# Patient Record
Sex: FEMALE | Employment: PART TIME | URBAN - METROPOLITAN AREA
[De-identification: names, ages, dates, MRNs, and addresses within clinical notes are randomized per-mention and may not be internally consistent; named-entity substitution may affect disease eponyms.]

---

## 2020-10-27 ENCOUNTER — HOSPITAL ENCOUNTER (OUTPATIENT)
Dept: PHYSICAL THERAPY | Age: 65
Discharge: HOME | End: 2020-10-27
Payer: MEDICARE

## 2020-10-27 PROCEDURE — 97162 PT EVAL MOD COMPLEX 30 MIN: CPT

## 2020-10-27 PROCEDURE — 97110 THERAPEUTIC EXERCISES: CPT

## 2020-10-27 NOTE — PROGRESS NOTES
[facility] PHYSICAL THERAPY AT [address] [facility], Albin Huffman [place], [address] - Phone: [phone]  Fax: [phone] / [address] [facility]  Patient Name: Ramila Hernandez : 1955   Medical   Diagnosis: Low back pain [M54.5] Treatment Diagnosis: Low back pain (R>L)   Onset Date: 1.5 months ago     Referral Source: Leah Saba MD [facility]): 10/27/2020   Prior Hospitalization: See medical history Provider #: [id number]   Prior Level of Function: Ind   Comorbidities: HBP, diabetes   Medications: Verified on Patient Summary List   The Plan of Care and following information is based on the information from the initial evaluation.   ===================================================================  Assessment / key information: Patient  is a 72 y.o. yo female who presents to [facility] with diagnosis of Low back pain [M54.5]. Patient reports she went to ER 10- for low back pain, she reports she got up one Friday morning and couldn't move. Pt reports she had MRI done about 2 weeks ago \"pinched nerve in low back\". Pt reports pain is mostly down R LE. As per patient she was taking steroids middle of Sept, and then muscle relaxant prescribed by Dr. Margot Dyson which has improved pain slightly. Pt reports she is currently sleeping on L side secondary to p!. Pain level is an 3-4/10 (C), and ranges 2/10 to 6/10 which increases with \"putting pressure on that nerve\", decreases with rest. Pt reports she is a , job duties require a lot of sitting. Upon objective evaluation, patient demonstrates, impaired and painful trunk AROM flexion, decreased core strength, postural deviations decreased weightbearing through R glute in sitting, L trunk lean. In standing: L trunk lean, L shoulder higher than R, pelvis/lower trunk R rotation.    impaired muscular flexibility and decreased hip strength. Patient scored 62 on FOTO indicating decreased functional status and quality of life. Functional impairmens include modified sleep, sitting with modification, pt reports she is able to walk for less than 1/2 mile (PLOF 1-1.5 miles), Pt currently using cane for ambulation in community and crutches at home, pt reports using crutches for ascending and descending stairs. Gait ambulating with SPC supported by R UE, antalgic, decreased stance on R LE    Postural deviations decreased weightbearing through R glute in sitting, L trunk lean. In standing: L trunk lean, L shoulder higher than R, pelvis/lower trunk R rotation. Decreased pain in prone positioning. Trunk AROM flexion 75%, extension WFL, right side bend 75%, left side bend WNL, right rotation 75%, left rotation 75%. Manual muscle test: Psoas right 3+/5, left 3+/5. Gluteus medius right 3+/5, left 4-/5, gluteus joanna right 2+/5, left 3-/5 , Quadriceps right 5/5, left 5/5,  Anterior tibialis right 5/5 , left 5/5    Special Tests :  Melvin Gum (-) bilat. Flexibility: Passive SLR right 60/90,  left 50/90.  Ely's (+) bilat R>L    Palpation : no Ttp noted along bilat piriformis, bilat QL, bilat lumbar and thoracic paraspinals       Justification for Eval Code Complexity:  Patient History : HBP, Diabetes  Examination see exam   Clinical Presentation: mod  Clinical Decision Making : FOTO : 62 /100    Patient would benefit from PT to address these deficits for increased functional mobility and quality of life.   ===========================================================================================  Eval Complexity: History MEDIUM  Complexity : 1-2 comorbidities / personal factors will impact the outcome/ POC ;  Examination  MEDIUM Complexity : 3 Standardized tests and measures addressing body structure, function, activity limitation and / or participation in recreation ; Presentation MEDIUM Complexity : Evolving with changing characteristics ; Decision Making MEDIUM Complexity : FOTO score of 26-74; Overall Complexity MEDIUM  Problem List: pain affecting function, decrease ROM, decrease strength, impaired gait/ balance, decrease ADL/ functional abilitiies, decrease activity tolerance, decrease flexibility/ joint mobility and decrease transfer abilities   Treatment Plan may include any combination of the following: Therapeutic exercise, Therapeutic activities, Neuromuscular re-education, Physical agent/modality, Gait/balance training, Manual therapy, Aquatic therapy, Patient education, Self Care training, Functional mobility training, Home safety training and Stair training  Patient / Family readiness to learn indicated by: asking questions, trying to perform skills and interest  Persons(s) to be included in education: patient (P)  Barriers to Learning/Limitations: None  Measures taken, if barriers to learning    Patient Goal (s): \"relieve leg pain\"   Patient self reported health status: good  Rehabilitation Potential: good     Short Term Goals: To be accomplished in 2  weeks:  1) Patient will be independent with performing daily initial HEP. 2. Patient will achieve +2 on GROC to improve sitting, walking.  Long Term Goals: To be accomplished in 4-5 weeks:  1) Patient  will be independent  with comprehensive updated HEP   2) Patient will increase bilat hip strength flexion and extension to at least 4/5 to increase ability to perform ADL's and LE functional activities. 3) Increase FOTO to 69 indicating improved function and quality of life. 4)Patient will achieve +4 on GROC to improve sitting, walking.     Frequency / Duration:   Patient to be seen  2  times per week for 4-5  weeks:    Patient / Caregiver education and instruction: other pt provided with initial HEP, cane placement  Therapist Signature: Oscar Doll PT Date: 87/20/5853   Certification Period: 10/27/2020 - 01/25/2021 Time: 1435 ===========================================================================================  I certify that the above Physical Therapy Services are being furnished while the patient is under my care. I agree with the treatment plan and certify that this therapy is necessary. Physician Signature:        Date:       Time:     Please sign and return to [facility] or you may fax the signed copy to [phone]. Thank you.

## 2020-10-27 NOTE — PROGRESS NOTES
PHYSICAL THERAPY - DAILY TREATMENT NOTE    Patient Name: James Whitehead        Date: 10/27/2020  : 1955   yes Patient  Verified  Visit #:   1   of   8-10   Insurance: Payor: Wesley Adjutant / Plan: [facility] MEDICARE COMPLETE / Product Type: Managed Care Medicare /      In time: 7246 Out time: 1250   Total Treatment Time: 62     Medicare/BCBS Time Tracking (below)   Total Timed Codes (min):  10 1:1 Treatment Time:  62     TREATMENT AREA =  Low back pain [M54.5]    SUBJECTIVE  Pain Level (on 0 to 10 scale):  3-4  / 10   Medication Changes/New allergies or changes in medical history, any new surgeries or procedures?    See Summary List   Subjective Functional Status/Changes:  []  No changes reported     Pt reports pain R glute/leg          OBJECTIVE    44 min [x]Eval                  []Re-Eval       Modalities Rationale:     decrease pain and increase tissue extensibility to improve patient's ability to ambulate with decreased pain and discomfort    min [] Estim, type/location:                                      []  att     []  unatt     []  w/US     []  w/ice    []  w/heat    min []  Mechanical Traction: type/lbs                   []  pro   []  sup   []  int   []  cont    []  before manual    []  after manual    min []  Ultrasound, settings/location:      min []  Iontophoresis w/ dexamethasone, location:                                               []  take home patch       []  in clinic   8 min []  Ice     [x]  Heat    location/position: Sitting, L/S    min []  Vasopneumatic Device, press/temp:     min []  Other:    [] Skin assessment post-treatment (if applicable):    []  intact    []  redness- no adverse reaction     []redness - adverse reaction:        10 min Therapeutic Exercise:  [x]  See flow sheet   Rationale:      increase ROM and increase strength to improve the patients ability to perform LE functional activities and ALD's with decreased pain         Billed With/As:   [x] TE   [] TA   [] Neuro   [] Self Care Patient Education: [x] Review HEP    [] Progressed/Changed HEP based on:   [] positioning   [] body mechanics   [] transfers   [] heat/ice application    [] other: provided with initial HEP, cane placement on L UE     Other Objective/Functional Measures:    SEE POC   Post Treatment Pain Level (on 0 to 10) scale:   2  / 10     ASSESSMENT  Assessment/Changes in Function:     SEE POC     [x]  See Progress Note/Recertification   Patient will benefit from skilled PT services to modify and progress therapeutic interventions, address functional mobility deficits, address ROM deficits, address strength deficits, analyze and address soft tissue restrictions, analyze and cue movement patterns, analyze and modify body mechanics/ergonomics, assess and modify postural abnormalities, address imbalance/dizziness and instruct in home and community integration to attain goals and maximize pt's functional status   Progress toward goals / Updated goals:  SEE POC     PLAN  []  Upgrade activities as tolerated yes Continue plan of care   []  Discharge due to :    [x]  Other: SEE POC     Therapist: Linda Mata, PT    Date: 10/27/2020 Time: 1309 pm     Future Appointments   Date Time Provider Tulio Hammond   10/27/2020 11:45 AM [facility]

## 2020-10-30 ENCOUNTER — HOSPITAL ENCOUNTER (OUTPATIENT)
Dept: PHYSICAL THERAPY | Age: 65
Discharge: HOME | End: 2020-10-30
Payer: MEDICARE

## 2020-10-30 PROCEDURE — 97140 MANUAL THERAPY 1/> REGIONS: CPT

## 2020-10-30 PROCEDURE — 97110 THERAPEUTIC EXERCISES: CPT

## 2020-10-30 NOTE — PROGRESS NOTES
PHYSICAL THERAPY - DAILY TREATMENT NOTE    Patient Name: Prosper Chamorro        Date: 10/30/2020  : 1955   yes Patient  Verified  Visit #:   2  of   8-10  Insurance: Payor: Sondra Commander / Plan: [employer] MEDICARE COMPLETE / Product Type: Managed Care Medicare /      In time: 9:00 Out time: 9:53   Total Treatment Time: 53     Medicare/BCBS Time Tracking (below)   Total Timed Codes (min):  43 1:1 Treatment Time:  43     TREATMENT AREA =  Low back pain [M54.5]    SUBJECTIVE  Pain Level (on 0 to 10 scale):  3  / 10 right L/S with posterior radicular sx to behind knee   Medication Changes/New allergies or changes in medical history, any new surgeries or procedures? See Summary List   Subjective Functional Status/Changes:  []  No changes reported   Patient reports compliance with HEP. They make her sore. She describes her pain as more of an ache and discomfort than actual pain.         OBJECTIVE    Modalities Rationale:     decrease pain and increase tissue extensibility to improve patient's ability to ambulate with decreased pain and discomfort    min [] Estim, type/location:                                      []  att     []  unatt     []  w/US     []  w/ice    []  w/heat    min []  Mechanical Traction: type/lbs                   []  pro   []  sup   []  int   []  cont    []  before manual    []  after manual    min []  Ultrasound, settings/location:      min []  Iontophoresis w/ dexamethasone, location:                                               []  take home patch       []  in clinic   10 min []  Ice     [x]  Heat    location/position: Supine with wedge to L/S and glutes    min []  Vasopneumatic Device, press/temp:     min []  Other:    [x] Skin assessment post-treatment (if applicable):    [x]  intact    []  redness- no adverse reaction     []redness - adverse reaction:        35/30 min Therapeutic Exercise:  [x]  See flow sheet: NC 5' Bike warmup   Rationale:      increase ROM and increase strength to improve the patients ability to perform LE functional activities and ALD's with decreased pain     8 min Manual Therapy:  performed in prone with 1 pillow under hips  Technique:       [x] STM []ASTM [x] TPR []PROM [x] MFR/Stretching  [x] Jt manipulation []Gr I [] II [x]  III [x] IV [] V []  Treatment Area: right lumbar ps, QL  Other: PA mobs to L3-S1, TrPR to right PSIS/QL     Rationale: decrease pain, increase ROM, increase tissue extensibility, decrease trigger points and increase postural awareness to allow patient to ambulate with improved gait pattern and tolerate prolonged standing/walking. Billed With/As:   [x] TE   [] TA   [] Neuro   [] Self Care Patient Education: [x] Review HEP    [] Progressed/Changed HEP based on:   [] positioning   [] body mechanics   [] transfers   [] heat/ice application    [x] other: education on HNP and centralization goals, given MARTITA 1 x 10 x 5\" each waking hour and report back on results at Monday appt. 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 Other Objective/Functional Measures:   - prone activities with 1 pillow under hips  - tactile and verbal cuing for proper pelvic positioning with prone on elbows  - education on HNP and centralization  - MARTITA and REIL x 10 each  - patient preferred MHP in supine, poor tolerance to prone at this time   Post Treatment Pain Level (on 0 to 10) scale:   2  / 10 with decreased severity of radicular sx     ASSESSMENT  Assessment/Changes in Function:   Initiated PT POC today per flow sheet, requiring vc and demo 100% of the time for proper form and technique with TE.       [x]  See Progress Note/Recertification   Patient will benefit from skilled PT services to modify and progress therapeutic interventions, address functional mobility deficits, address ROM deficits, address strength deficits, analyze and address soft tissue restrictions, analyze and cue movement patterns, analyze and modify body mechanics/ergonomics, assess and modify postural abnormalities, address imbalance/dizziness and instruct in home and community integration to attain goals and maximize pt's functional status   Progress toward goals / Updated goals:  First f/u since evaluation, compliant with HEP, no change in sx at this time.      PLAN  []  Upgrade activities as tolerated yes Continue plan of care   []  Discharge due to :    []  Other:      Therapist: RONNA Sullivan    Date: 10/30/2020 Time: 9:53 AM     Future Appointments   Date Time Provider Tulio Hammond   10/30/2020  9:00 AM [facility] [place] [facility]   11/2/2020  9:45 AM [facility] [place] [facility]   11/6/2020  9:00 AM [facility] PT [place] [facility]   11/11/2020  9:45 AM Ahmed Curling, PT [facility]   11/13/2020  9:00 AM [facility] PT [place] [facility]   11/18/2020  9:15 AM Ahmed Curling, PT [facility]   11/20/2020  9:00 AM [facility] PT [place] [facility]   11/23/2020 10:30 AM [facility] PT [place] [facility]   11/25/2020  8:45 AM Pam Burgess, PT [facility]

## 2020-11-02 ENCOUNTER — HOSPITAL ENCOUNTER (OUTPATIENT)
Dept: PHYSICAL THERAPY | Age: 65
Discharge: HOME | End: 2020-11-02
Payer: MEDICARE

## 2020-11-02 PROCEDURE — 97110 THERAPEUTIC EXERCISES: CPT

## 2020-11-02 PROCEDURE — 97140 MANUAL THERAPY 1/> REGIONS: CPT

## 2020-11-02 NOTE — PROGRESS NOTES
PHYSICAL THERAPY - DAILY TREATMENT NOTE    Patient Name: Will Guerin        Date: 2020  : 1955   yes Patient  Verified  Visit #:   3  of   8-10  Insurance: Payor: Rebekah Keith / Plan: [address] / Product Type: Managed Care Medicare /      In time: 9:37 Out time: 10:25   Total Treatment Time: 47     Medicare/[facility] Time Tracking (below)   Total Timed Codes (min):  38 1:1 Treatment Time:  38     TREATMENT AREA =  Low back pain [M54.5]    SUBJECTIVE  Pain Level (on 0 to 10 scale):  2  / 10 right L/S with posterior radicular sx to behind knee   Medication Changes/New allergies or changes in medical history, any new surgeries or procedures? See Summary List   Subjective Functional Status/Changes:  []  No changes reported   Patient returned to work this weekend after a month off but reports it went well. She got up and did her extensions every hour and it worked well.         OBJECTIVE    Modalities Rationale:     decrease pain and increase tissue extensibility to improve patient's ability to ambulate with decreased pain and discomfort    min [] Estim, type/location:                                      []  att     []  unatt     []  w/US     []  w/ice    []  w/heat    min []  Mechanical Traction: type/lbs                   []  pro   []  sup   []  int   []  cont    []  before manual    []  after manual    min []  Ultrasound, settings/location:      min []  Iontophoresis w/ dexamethasone, location:                                               []  take home patch       []  in clinic   10 min []  Ice     [x]  Heat    Location/position: L/S Prone with 1 pillow under hips, toes over EOB    min []  Vasopneumatic Device, press/temp:     min []  Other:    [x] Skin assessment post-treatment (if applicable):    [x]  intact    []  redness- no adverse reaction     []redness - adverse reaction:        23 min Therapeutic Exercise:  [x]  See flow sheet:    Rationale:      increase ROM and increase strength to improve the patients ability to perform LE functional activities and ALD's with decreased pain     15 min Manual Therapy:  performed in prone with 1 pillow under hips  Technique:       [x] STM []ASTM [x] TPR []PROM [] Stretching  [x] Jt manipulation []Gr I [] II [x]  III [x] IV [] V []  Treatment Area: right lumbar ps  Other: PA mobs to L3-S1, 10 PPU with OP, TrPR to right PSIS/QL     Rationale: decrease pain, increase ROM, increase tissue extensibility, decrease trigger points and increase postural awareness to allow patient to ambulate with improved gait pattern and tolerate prolonged standing/walking. Billed With/As:   [x] TE   [] TA   [] Neuro   [] Self Care Patient Education: [x] Review HEP    [] Progressed/Changed HEP based on:   [] positioning   [] body mechanics   [] transfers   [] heat/ice application    [] other:      Other Objective/Functional Measures:   - prone activities with 1 pillow under hips  - initiated prone TKE, mod challenge   Post Treatment Pain Level (on 0 to 10) scale:   2  / 10 with decreased severity of radicular sx     ASSESSMENT  Assessment/Changes in Function:   Patient presents with decreased baseline pain level but continues to reports sx in posterior leg to behind the knee. Good management of sx with extension based exercises and side glides. Multiple trigger points over right PSIS addressed with TrPR during MT. Patient able to tolerate 10 minutes in prone for MHP modalities today and demonstrates decreased need for [facility] when leaving the clinic.       [x]  See Progress Note/Recertification   Patient will benefit from skilled PT services to modify and progress therapeutic interventions, address functional mobility deficits, address ROM deficits, address strength deficits, analyze and address soft tissue restrictions, analyze and cue movement patterns, analyze and modify body mechanics/ergonomics, assess and modify postural abnormalities, address imbalance/dizziness and instruct in home and community integration to attain goals and maximize pt's functional status   Progress toward goals / Updated goals:  Patient is compliant with HEP and reports some improvement in sx. PLAN  []  Upgrade activities as tolerated yes Continue plan of care   []  Discharge due to :    [x]  Other: Progress extension based program, to no pillow under hips.      Therapist: RONNA Loaiza    Date: 11/2/2020 Time: 10:25 AM     Future Appointments   Date Time Provider Tulio Hammond   11/2/2020  9:45 AM [facility] PT [place] [facility]   11/6/2020  9:00 AM [facility] PT [place] [facility]   11/11/2020  9:45 AM Enoc Wahl, PT [facility]   11/13/2020  9:00 AM [facility] PT [place] [facility]   11/18/2020  9:15 AM Enoc Wahl, PT [facility]   11/20/2020  9:00 AM [facility] PT [place] [facility]   11/23/2020 10:30 AM [facility] PT [place] [facility]   11/25/2020  8:45 AM Ashley Aaron, PT [facility]

## 2020-11-06 ENCOUNTER — APPOINTMENT (OUTPATIENT)
Dept: PHYSICAL THERAPY | Age: 65
End: 2020-11-06
Payer: MEDICARE

## 2020-11-11 ENCOUNTER — HOSPITAL ENCOUNTER (OUTPATIENT)
Dept: PHYSICAL THERAPY | Age: 65
Discharge: HOME | End: 2020-11-11
Payer: MEDICARE

## 2020-11-11 PROCEDURE — 97110 THERAPEUTIC EXERCISES: CPT

## 2020-11-11 PROCEDURE — 97140 MANUAL THERAPY 1/> REGIONS: CPT

## 2020-11-11 NOTE — PROGRESS NOTES
PHYSICAL THERAPY - DAILY TREATMENT NOTE    Patient Name: Roderick Landing        Date: 2020  : 1955   yes Patient  Verified  Visit #:   4  of   8-10  Insurance: Payor: Katheren Cooks / Plan: [address] / Product Type: Managed Care Medicare /      In time: 9:40 Out time: 10:30   Total Treatment Time: 50     Medicare/[facility] Time Tracking (below)   Total Timed Codes (min):  40 1:1 Treatment Time:  40     TREATMENT AREA =  Low back pain [M54.5]    SUBJECTIVE  Pain Level (on 0 to 10 scale):  2  / 10 R Lumbar pain   Medication Changes/New allergies or changes in medical history, any new surgeries or procedures? See Summary List   Subjective Functional Status/Changes:  []  No changes reported   Pt reports extension exercises are helping. Notes return to work has not increased symptoms. Reporting this day at pain to mid-thigh posterior vs to foot as previous. Has been walking in community with [facility] with good response, reduced pain.          OBJECTIVE    Modalities Rationale:     decrease pain and increase tissue extensibility to improve patient's ability to ambulate with decreased pain and discomfort    min [] Estim, type/location:                                      []  att     []  unatt     []  w/US     []  w/ice    []  w/heat    min []  Mechanical Traction: type/lbs                   []  pro   []  sup   []  int   []  cont    []  before manual    []  after manual    min []  Ultrasound, settings/location:      min []  Iontophoresis w/ dexamethasone, location:                                               []  take home patch       []  in clinic   10 min []  Ice     [x]  Heat    Location/position: L/S Prone with 1 pillow under hips    min []  Vasopneumatic Device, press/temp:     min []  Other:    [x] Skin assessment post-treatment (if applicable):    [x]  intact    []  redness- no adverse reaction     []redness - adverse reaction:        30 min Therapeutic Exercise:  [x]  See flow sheet:    Rationale:      increase ROM and increase strength to improve the patients ability to perform LE ADLs with decreased pain     10 min Manual Therapy:  prone  Technique:      [x] STM []ASTM [x] TPR []PROM [] Stretching  [x] Jt manipulation [x]Gr I [x] II [x]  III [x] IV [x] V []  Treatment Area: R lumbar region  Other: PA mobs to ~L1-L5,TrP to right PSIS, QL     Rationale: decrease pain, increase ROM, increase tissue extensibility, decrease trigger points and increase postural awareness to allow patient to ambulate with less pain    Billed With/As:   [x] TE   [] TA   [] Neuro   [] Self Care Patient Education: [x] Review HEP    [] Progressed/Changed HEP based on:   [] positioning   [] body mechanics   [] transfers   [] heat/ice application    [] other:      Other Objective/Functional Measures:   -  Pt ed on prone press ups for HEP  - Pt with review of current HEP to manage symptoms between visits. Post Treatment Pain Level (on 0 to 10) scale:   2  / 10      ASSESSMENT  Assessment/Changes in Function:   -Pt with reduced radicular symptoms noted. -Good response to sideglides and extension based activity. [x]  See Progress Note/Recertification   Patient will benefit from skilled PT services to modify and progress therapeutic interventions, address functional mobility deficits, address ROM deficits, address strength deficits, analyze and address soft tissue restrictions, analyze and cue movement patterns, analyze and modify body mechanics/ergonomics, assess and modify postural abnormalities, address imbalance/dizziness and instruct in home and community integration to attain goals and maximize pt's functional status   Progress toward goals / Updated goals:     · Short Term Goals: To be accomplished in 2  weeks:  1) Patient will be independent with performing daily initial HEP--met. 2) Patient will achieve +2 on GROC to improve sitting, walking--currently at fair progress with subjective reports. PLAN  []  Upgrade activities as tolerated yes Continue plan of care   []  Discharge due to :    [x]  Other: Progress extension based program as appropriate     Therapist: Ella Cottrell PT, PT    Date: 11/11/2020 Time: 10:30 AM     Future Appointments   Date Time Provider Tulio Hammond   11/13/2020  9:00 AM Lorrie Canada [facility]   11/18/2020  9:15 AM John Walters PT [facility]   11/20/2020  9:00 AM [facility] PT [place] [facility]   11/23/2020 10:30 AM [facility] PT [place] [facility]   11/25/2020  8:45 AM Yves Pollard PT [facility]   11/30/2020  9:00 AM Yuniel Car PTA [facility]

## 2020-11-13 ENCOUNTER — HOSPITAL ENCOUNTER (OUTPATIENT)
Dept: PHYSICAL THERAPY | Age: 65
Discharge: HOME | End: 2020-11-13
Payer: MEDICARE

## 2020-11-13 PROCEDURE — 97140 MANUAL THERAPY 1/> REGIONS: CPT

## 2020-11-13 PROCEDURE — 97110 THERAPEUTIC EXERCISES: CPT

## 2020-11-13 NOTE — PROGRESS NOTES
PHYSICAL THERAPY - DAILY TREATMENT NOTE    Patient Name: Nicci Terry        Date: 2020  : 1955    Patient  Verified: YES  Visit #:      12  Insurance: Payor: Jacqueline Munoz / Plan: [employer] MEDICARE COMPLETE / Product Type: Managed Care Medicare /      In time: 9:10 Out time: 10:15   Total Treatment Time: 65     Medicare Time Tracking (below)   Total Timed Codes (min):  55 1:1 Treatment Time:  40     TREATMENT AREA/ DIAGNOSIS = Low back pain [M54.5]    SUBJECTIVE  Pain Level (on 0 to 10 scale):  1  / 10   Medication Changes/New allergies or changes in medical history, any new surgeries or procedures? NO    If yes, update Summary List   Subjective Functional Status/Changes:  []  No changes reported     Pt reports that she is still having buttock pain with ADLs (R)       OBJECTIVE  Modalities Rationale:     decrease pain and increase tissue extensibility to improve patient's ability to perform ADLs without pain     min [] Estim, type/location:                                      []  att     []  unatt     []  w/US     []  w/ice    []  w/heat    min []  Mechanical Traction: type/lbs                   []  pro   []  sup   []  int   []  cont    []  before manual    []  after manual    min []  Ultrasound, settings/location:      min []  Iontophoresis w/ dexamethasone, location:                                               []  take home patch       []  in clinic   10 min []  Ice     [x]  Heat    location/position: L/S    min []  Vasopneumatic Device, press/temp:     min []  Other:    [] Skin assessment post-treatment (if applicable):    []  intact    []  redness- no adverse reaction     []redness - adverse reaction:        45 min Therapeutic Exercise:  [x]  See flow sheet   Rationale:      increase strength and improve coordination to improve the patients ability to perform ADLs without pain       10 min Manual Therapy: STM to L/S (R QL).  PA mobs to L/S grade 3/4   Rationale: increase ROM and increase tissue extensibility to improve patient's ability to perform ADLs without pain      Billed With/As:   [x] TE   [] TA   [] Neuro   [] Self Care Patient Education: [x] Review HEP    [] Progressed/Changed HEP based on:   [] positioning   [] body mechanics   [] transfers   [] heat/ice application    [] other:        Other Objective/Functional Measures:    TTP to paraspinals  Radicular pain to R buttock    Post Treatment Pain Level (on 0 to 10) scale:   1  / 10     ASSESSMENT  Assessment/Changes in Function:     Improved radicular pain with pratibha approach     []  See Progress Note/Recertification   Patient will continue to benefit from skilled PT services to modify and progress therapeutic interventions, address functional mobility deficits, address ROM deficits, address strength deficits, analyze and address soft tissue restrictions and analyze and cue movement patterns to attain remaining goals.    Progress toward goals / Updated goals:    Good Progress to    [] STG    [x] LTG  1 as shown by improved overall pain levels with ADLs     PLAN  [x]  Upgrade activities as tolerated YES Continue plan of care   []  Discharge due to :    []  Other:      Therapist: Dalton Sue DPT     Date: 11/13/2020 Time: 9:03 AM        Future Appointments   Date Time Provider Tulio Hammond   11/18/2020  9:15 AM Sharonda Otto, PT [facility]   11/20/2020  9:00 AM [facility] [place] [facility]   11/23/2020 10:30 AM [facility] [place] [facility]   11/25/2020  8:45 AM [facility]   11/30/2020  9:00 AM [facility] [place] [facility]

## 2020-11-18 ENCOUNTER — HOSPITAL ENCOUNTER (OUTPATIENT)
Dept: PHYSICAL THERAPY | Age: 65
Discharge: HOME | End: 2020-11-18
Payer: MEDICARE

## 2020-11-18 PROCEDURE — 97140 MANUAL THERAPY 1/> REGIONS: CPT

## 2020-11-18 PROCEDURE — 97110 THERAPEUTIC EXERCISES: CPT

## 2020-11-18 NOTE — PROGRESS NOTES
PHYSICAL THERAPY - DAILY TREATMENT NOTE    Patient Name: Josué Gibbs        Date: 2020  : 1955   yes Patient  Verified  Visit #:  6 of   8-10  Insurance: Payor: [employer] / Plan: [employer] MEDICARE COMPLETE / Product Type: Managed Care Medicare /      In time: 989 Out time: 955   Total Treatment Time: 50     Medicare/[employer] Time Tracking (below)   Total Timed Codes (min):  40 1:1 Treatment Time:  40     TREATMENT AREA =  Low back pain [M54.5]    SUBJECTIVE  Pain Level (on 0 to 10 scale):  2  / 10 R Lumbar pain   Medication Changes/New allergies or changes in medical history, any new surgeries or procedures? See Summary List   Subjective Functional Status/Changes:  []  No changes reported   Pain is improving at night. Noting she able to perform R sidelying for sleep x 1- 1.5 hours whereas prior she could not sleep at all on that side. Noting that is improving her overall function. Noting weakness in the leg is primary limitation.          OBJECTIVE    Modalities Rationale:     decrease pain and increase tissue extensibility to improve patient's ability to ambulate with decreased pain and discomfort    min [] Estim, type/location:                                      []  att     []  unatt     []  w/US     []  w/ice    []  w/heat    min []  Mechanical Traction: type/lbs                   []  pro   []  sup   []  int   []  cont    []  before manual    []  after manual    min []  Ultrasound, settings/location:      min []  Iontophoresis w/ dexamethasone, location:                                               []  take home patch       []  in clinic   10 min []  Ice     [x]  Heat      Supine to lumbar    min []  Vasopneumatic Device, press/temp:     min []  Other:    [x] Skin assessment post-treatment (if applicable):    [x]  intact    []  redness- no adverse reaction     []redness - adverse reaction:        30 min Therapeutic Exercise:  [x]  See flow sheet:    Rationale:      increase ROM and increase strength to improve the patients ability to perform LE ADLs with decreased pain     10 min Manual Therapy:  prone  Technique:      [x] STM []ASTM [x] TPR []PROM [] Stretching  [x] Jt manipulation [x]Gr I [x] II [x]  III [x] IV [x] V []  Treatment Area: R lumbar region  Other: PA mobs to ~L1-L5,TPR to R PSIS, QL     Rationale: decrease pain, increase ROM, increase tissue extensibility, decrease trigger points and increase postural awareness to allow patient to ambulate with less pain    Billed With/As:   [x] TE   [] TA   [] Neuro   [] Self Care Patient Education: [x] Review HEP    [] Progressed/Changed HEP based on:   [] positioning   [] body mechanics   [] transfers   [] heat/ice application    [] other:      Other Objective/Functional Measures:   --Pt ed on progression of HEP.  --Pt with ability to perform prone activity with improved motion, reduced verbal and tactile cues. Post Treatment Pain Level (on 0 to 10) scale:   2  / 10      ASSESSMENT  Assessment/Changes in Function:   --Pt with centralization of symptoms with prone positioning and there ex this day. [x]  See Progress Note/Recertification   Patient will benefit from skilled PT services to modify and progress therapeutic interventions, address functional mobility deficits, address ROM deficits, address strength deficits, analyze and address soft tissue restrictions, analyze and cue movement patterns, analyze and modify body mechanics/ergonomics and assess and modify postural abnormalities to attain goals and maximize pt's functional status   Progress toward goals / Updated goals:      Pt with good progress with pain, however, continues to be limited in strength in R LE. Would benefit from gradual progression of there ex as appropriate.      PLAN  []  Upgrade activities as tolerated yes Continue plan of care   []  Discharge due to :    [x]  Other: Progress extension based program and neutral core stability program as appropriate Therapist: Daljit Crockett, PT      Date: 11/18/2020 Time: 10 AM     Future Appointments   Date Time Provider Tulio Hammond   11/20/2020  9:00 AM [facility] PT [place] [facility]   11/23/2020 10:30 AM [facility] [place] [facility]   11/25/2020  8:45 AM [facility]   11/30/2020  9:00 AM [facility] [place] [facility]

## 2020-11-20 ENCOUNTER — HOSPITAL ENCOUNTER (OUTPATIENT)
Dept: PHYSICAL THERAPY | Age: 65
Discharge: HOME | End: 2020-11-20
Payer: MEDICARE

## 2020-11-20 PROCEDURE — 97110 THERAPEUTIC EXERCISES: CPT

## 2020-11-20 PROCEDURE — 97140 MANUAL THERAPY 1/> REGIONS: CPT

## 2020-11-20 NOTE — PROGRESS NOTES
PHYSICAL THERAPY - DAILY TREATMENT NOTE    Patient Name: Ida Mortensen        Date: 2020  : 1955   yes Patient  Verified  Visit #:  7 of   8-10  Insurance: Payor: Rebecca Aus / Plan: [facility] MEDICARE COMPLETE / Product Type: Managed Care Medicare /      In time: 9:08 Out time: 10:10   Total Treatment Time: 62     Medicare/[employer] Time Tracking (below)   Total Timed Codes (min):  52 1:1 Treatment Time:  52     TREATMENT AREA =  Low back pain [M54.5]    SUBJECTIVE  Pain Level (on 0 to 10 scale):  0-1   10    Medication Changes/New allergies or changes in medical history, any new surgeries or procedures? See Summary List   Subjective Functional Status/Changes:  []  No changes reported   Patient reports overall improvement with minimal radicular sx into right buttock.          OBJECTIVE    Modalities Rationale:     decrease pain and increase tissue extensibility to improve patient's ability to ambulate with decreased pain and discomfort    min [] Estim, type/location:                                      []  att     []  unatt     []  w/US     []  w/ice    []  w/heat    min []  Mechanical Traction: type/lbs                   []  pro   []  sup   []  int   []  cont    []  before manual    []  after manual    min []  Ultrasound, settings/location:      min []  Iontophoresis w/ dexamethasone, location:                                               []  take home patch       []  in clinic   10 min []  Ice     [x]  Heat      Supine to lumbar    min []  Vasopneumatic Device, press/temp:     min []  Other:    [x] Skin assessment post-treatment (if applicable):    [x]  intact    []  redness- no adverse reaction     []redness - adverse reaction:        42 min Therapeutic Exercise:  [x]  See flow sheet:    Rationale:      increase ROM and increase strength to improve the patients ability to perform LE ADLs with decreased pain     10 min Manual Therapy:  prone  Technique:      [x] STM []ASTM [x] TPR []PROM [] Stretching  [x] Jt manipulation [x]Gr I [x] II [x]  III [x] IV [x] V []  Treatment Area: R lumbar region  Other: PA mobs to ~L1-L5,TPR to R PSIS, QL     Rationale: decrease pain, increase ROM, increase tissue extensibility, decrease trigger points and increase postural awareness to allow patient to ambulate with less pain    Billed With/As:   [x] TE   [] TA   [] Neuro   [] Self Care Patient Education: [x] Review HEP    [] Progressed/Changed HEP based on:   [] positioning   [] body mechanics   [] transfers   [] heat/ice application    [] other:      Other Objective/Functional Measures:   - patient 8 minutes late  - KAUSHIK without pillow to increase lumbar extension  - PPU to tolerance, 50% ROM without compensation at hips  - prone TA activation today, moderate challenge  - progressed from PPT to bridges  - added clams, no band   Post Treatment Pain Level (on 0 to 10) scale:   0-1  / 10      ASSESSMENT  Assessment/Changes in Function:   Patient with good tolerance to progression of program today. No increase in pain however min-mod increase in ms fatigue. [x]  See Progress Note/Recertification   Patient will benefit from skilled PT services to modify and progress therapeutic interventions, address functional mobility deficits, address ROM deficits, address strength deficits, analyze and address soft tissue restrictions, analyze and cue movement patterns, analyze and modify body mechanics/ergonomics and assess and modify postural abnormalities to attain goals and maximize pt's functional status   Progress toward goals / Updated goals:     Patient able to progress with exercise program in clinic today to improve LE strength.      PLAN  []  Upgrade activities as tolerated yes Continue plan of care   []  Discharge due to :    [x]  Other: Progress extension based program and neutral core stability program as appropriate     Therapist: RONNA Bennett      Date: 11/20/2020 Time: 10:10 AM     Future Appointments   Date Time Provider Tulio Stephany   11/20/2020  9:00 AM [address] PT [place] [facility] [address]   11/23/2020 10:30 AM [address] PT [place] [facility] [address]   11/25/2020  8:45 AM Celine Chaves, PT [facility] [address]   11/30/2020  9:00 AM [address] PT [place] [facility] [address]

## 2020-11-23 ENCOUNTER — HOSPITAL ENCOUNTER (OUTPATIENT)
Dept: PHYSICAL THERAPY | Age: 65
Discharge: HOME | End: 2020-11-23
Payer: MEDICARE

## 2020-11-23 PROCEDURE — 97110 THERAPEUTIC EXERCISES: CPT

## 2020-11-23 PROCEDURE — 97140 MANUAL THERAPY 1/> REGIONS: CPT

## 2020-11-23 NOTE — PROGRESS NOTES
PHYSICAL THERAPY - DAILY TREATMENT NOTE    Patient Name: Anny Quan        Date: 2020  : 1955   yes Patient  Verified  Visit #:  8 of   8-10  Insurance: Payor: [place] / Plan: [address] / Product Type: Managed Care Medicare /      In time: 10:22 Out time: 11:25   Total Treatment Time: 63     Medicare/[facility] Time Tracking (below)   Total Timed Codes (min):  53 1:1 Treatment Time:  53     TREATMENT AREA =  Low back pain [M54.5]    SUBJECTIVE  Pain Level (on 0 to 10 scale):  1  / 10    Medication Changes/New allergies or changes in medical history, any new surgeries or procedures? See Summary List   Subjective Functional Status/Changes:  []  No changes reported   I had to take a pain killer this morning, I was at a 3/10. I worked this weekend. I am going to try to spread apart my work days, working 2 days in a row is too much. Pt reports she was sore after last session but it was a workout sore not a bad sore. SHe just used some heat when she got home and was fine.       OBJECTIVE    Modalities Rationale:     decrease pain and increase tissue extensibility to improve patient's ability to ambulate with decreased pain and discomfort    min [] Estim, type/location:                                      []  att     []  unatt     []  w/US     []  w/ice    []  w/heat    min []  Mechanical Traction: type/lbs                   []  pro   []  sup   []  int   []  cont    []  before manual    []  after manual    min []  Ultrasound, settings/location:      min []  Iontophoresis w/ dexamethasone, location:                                               []  take home patch       []  in clinic   10 min []  Ice     [x]  Heat      Prone to lumbar spine    min []  Vasopneumatic Device, press/temp:     min []  Other:    [x] Skin assessment post-treatment (if applicable):    [x]  intact    []  redness- no adverse reaction     []redness - adverse reaction:        43 min Therapeutic Exercise: [x]  See flow sheet:    Rationale:      increase ROM and increase strength to improve the patients ability to perform LE ADLs with decreased pain     10 min Manual Therapy:  prone  Technique:      [x] STM []ASTM [x] TPR []PROM [] Stretching  [x] Jt manipulation [x]Gr I [x] II [x]  III [x] IV [x] V []  Treatment Area: R lumbar region  Other: PA mobs to ~L1-L5,TPR to R PSIS, MFR to QL     Rationale: decrease pain, increase ROM, increase tissue extensibility, decrease trigger points and increase postural awareness to allow patient to ambulate with less pain    Billed With/As:   [x] TE   [] TA   [] Neuro   [] Self Care Patient Education: [x] Review HEP    [] Progressed/Changed HEP based on:   [] positioning   [] body mechanics   [] transfers   [] heat/ice application    [] other:      Other Objective/Functional Measures:   - progressed to L2 resistance on stationary bike to increase LE strength  - correction of hand positioning with PPU to encourage increased lumbar extension  - remains challenged with prone TA     Post Treatment Pain Level (on 0 to 10) scale:  2  / 10      ASSESSMENT  Assessment/Changes in Function:   Patient with increased discomfort and requiring additional time to complete each task today. Notable increase in right lumbar ps and QL ms tension, addressed with MT.      []  See Progress Note/Recertification   Patient will benefit from skilled PT services to modify and progress therapeutic interventions, address functional mobility deficits, address ROM deficits, address strength deficits, analyze and address soft tissue restrictions, analyze and cue movement patterns, analyze and modify body mechanics/ergonomics and assess and modify postural abnormalities to attain goals and maximize pt's functional status   Progress toward goals / Updated goals:     Patient with slow progress today d/t increased discomfort after working all weekend.       PLAN  []  Upgrade activities as tolerated yes Continue plan of care   []  Discharge due to :    [x]  Other: PN due NV     Therapist: RONNA Clarke      Date: 11/23/2020 Time: 11:25 AM     Future Appointments   Date Time Provider Tulio Hammond   11/23/2020 10:30 AM [address] PT [place] [facility] [address]   11/25/2020  8:45 AM Boy Garcia PT [facility] [address]   11/30/2020  9:00 AM [address] PT [place] [facility] [address]

## 2020-11-25 ENCOUNTER — HOSPITAL ENCOUNTER (OUTPATIENT)
Dept: PHYSICAL THERAPY | Age: 65
Discharge: HOME | End: 2020-11-25
Payer: MEDICARE

## 2020-11-25 PROCEDURE — 97110 THERAPEUTIC EXERCISES: CPT

## 2020-11-25 PROCEDURE — 97140 MANUAL THERAPY 1/> REGIONS: CPT

## 2020-11-25 NOTE — PROGRESS NOTES
PHYSICAL THERAPY - DAILY TREATMENT NOTE    Patient Name: Jose Kennedy        Date: 2020  : 1955    Patient  Verified: YES  Visit #:   9   of   10  Insurance: Payor: Marta Hernández / Plan: [address] / Product Type: Managed Care Medicare /      In time: 8:40 Out time: 9:30   Total Treatment Time: 50     Medicare/[facility] Time Tracking (below)   Total Timed Codes (min):  40 1:1 Treatment Time:  40     TREATMENT AREA/ DIAGNOSIS = Low back pain [M54.5]    SUBJECTIVE  Pain Level (on 0 to 10 scale):  1  / 10   Medication Changes/New allergies or changes in medical history, any new surgeries or procedures?     NO    If yes, update Summary List   Subjective Functional Status/Changes:  []  No changes reported     Functional improvement mcuh less leg pain and numbness sice starting therapy   Functional limitation bottom of R foot is numb and R buttock pain      OBJECTIVE  Modalities Rationale:     decrease pain and increase tissue extensibility to improve patient's ability to perform ADLs without pain   min [] Estim, type/location:                                      []  att     []  unatt     []  w/US     []  w/ice    []  w/heat    min []  Mechanical Traction: type/lbs                   []  pro   []  sup   []  int   []  cont    []  before manual    []  after manual    min []  Ultrasound, settings/location:      min []  Iontophoresis w/ dexamethasone, location:                                               []  take home patch       []  in clinic   10 min []  Ice     [x]  Heat    location/position: To L/S in prone    min []  Vasopneumatic Device, press/temp:     min []  Other:    [x] Skin assessment post-treatment (if applicable):    [x]  intact    []  redness- no adverse reaction     []redness - adverse reaction:        20 min Manual Therapy: DTM to L/S, GD IV PA mobs to L/S and sacral base, Pubic clearing and MET to correct R ant innominate, R piriformis release     Rationale: decrease pain, increase ROM and increase tissue extensibility to improve patient's ability to perform ADLs without pain  The manual therapy interventions were performed at a separate and distinct time from the therapeutic activities interventions. 20 min Therapeutic Exercise:  [x]  See flow sheet   Rationale:      increase ROM and increase strength to improve the patients ability to perform ADLs without pain     Billed With/As:   [x] TE   [] TA   [] Neuro   [] Self Care Patient Education: [x] Review HEP    [] Progressed/Changed HEP based on:   [] positioning   [] body mechanics   [] transfers   [] heat/ice application    [] other:        Other Objective/Functional Measures:  R buttock pain and R plantar numbness  REIL after 10 min of prone lying with massage increased R LE pain, RWAR  R SGIS increased R LE pain, but NWAR  Correction of R ant innominate, decreased R LE pain  Pt instructed on self correction of R ant innominate (R U bridge, 3 x 10 sec)   Post Treatment Pain Level (on 0 to 10) scale:   0  / 10     ASSESSMENT  Assessment/Changes in Function:     R ant innominate may be source of some R LE radic symptoms      []  See Progress Note/Recertification   Patient will continue to benefit from skilled PT services to modify and progress therapeutic interventions, address functional mobility deficits, address ROM deficits, address strength deficits, analyze and address soft tissue restrictions, analyze and cue movement patterns and analyze and modify body mechanics/ergonomics to attain remaining goals.    Progress toward goals / Updated goals:    Less pain after correction of R ant innominate     PLAN  [x]  Upgrade activities as tolerated YES Continue plan of care   []  Discharge due to :    []  Other:      Therapist: Rosa Bojorquez PT    Date: 11/25/2020 Time: 10:03 AM     Future Appointments   Date Time Provider Tulio Hammond   11/30/2020  9:00 AM [facility] [place] [facility]   12/2/2020  9:45 AM Juany Comes, PTA [facility]   12/8/2020 10:15 AM Geocorriney Comes, PTA [facility]   12/10/2020 11:15 AM Darreld Latch, PT [facility]   12/14/2020  9:45 AM Kuldip Comes, PTA [facility]   12/16/2020 10:15 AM Darreld Latch, PT [facility]   12/28/2020  9:45 AM Kuldip Comes, PTA [facility]   12/30/2020 10:15 AM Darreld Latch, PT [facility]

## 2020-11-30 ENCOUNTER — HOSPITAL ENCOUNTER (OUTPATIENT)
Dept: PHYSICAL THERAPY | Age: 65
Discharge: HOME | End: 2020-11-30
Payer: MEDICARE

## 2020-11-30 PROCEDURE — 97110 THERAPEUTIC EXERCISES: CPT

## 2020-11-30 PROCEDURE — 97530 THERAPEUTIC ACTIVITIES: CPT

## 2020-11-30 PROCEDURE — 97140 MANUAL THERAPY 1/> REGIONS: CPT

## 2020-11-30 NOTE — PROGRESS NOTES
PHYSICAL THERAPY - DAILY TREATMENT NOTE    Patient Name: Yohannes Russell        Date: 2020  : 1955    Patient  Verified: YES  Visit #:   10   of   10  Insurance: Payor: Florentin Whalen / Plan: [facility] MEDICARE COMPLETE / Product Type: Managed Care Medicare /      In time: 9:00 Out time: 9:48   Total Treatment Time: 48     Medicare/[facility] Time Tracking (below)   Total Timed Codes (min):  48 1:1 Treatment Time:  48     TREATMENT AREA/ DIAGNOSIS = Low back pain [M54.5]    SUBJECTIVE  Pain Level (on 0 to 10 scale):  0  / 10   Medication Changes/New allergies or changes in medical history, any new surgeries or procedures? NO    If yes, update Summary List   Subjective Functional Status/Changes:  []  No changes reported   Patient reports she was so exhausted she did not even do the exercise fore the pelvic alignment. Pt has been making slow and steady progress in therapy since their IE on 10/27/2020. Pt reports 95% overall improvement with functional ADL's since beginning PT. Pt's pain range 1-3/10. Patient's therapy has consisted of therapeutic exercise, therapeutic activity, neuromuscular re-education, manual therapy, hot pack modality, patient education and HEP. Patient has demonstrated the following functional improvements in therapy: decreased pain and radicular sx, increased LE strength does not need to use SPC, sitting tolerance to 3-4hrs, increased walking distance <1 mile. Patient continues to need work on: sleeping on R side without pain, maintaining symmetrical pelvic alignment, increasing core/lumbpelivs and LE strength.       OBJECTIVE  Modalities Rationale:     decrease pain and increase tissue extensibility to improve patient's ability to perform ADLs without pain   min [] Estim, type/location:                                      []  att     []  unatt     []  w/US     []  w/ice    []  w/heat    min []  Mechanical Traction: type/lbs                   []  pro   []  sup   [] int   []  cont    []  before manual    []  after manual    min []  Ultrasound, settings/location:      min []  Iontophoresis w/ dexamethasone, location:                                               []  take home patch       []  in clinic   PD min []  Ice     [x]  Heat    location/position: To L/S in prone    min []  Vasopneumatic Device, press/temp:     min []  Other:    [x] Skin assessment post-treatment (if applicable):    [x]  intact    []  redness- no adverse reaction     []redness - adverse reaction:        22 min Manual Therapy: DTM to L/S, GD IV PA mobs to L/S and sacral base, Pubic clearing and MET to correct R ant innominate, R piriformis release     Rationale:      decrease pain, increase ROM and increase tissue extensibility to improve patient's ability to perform ADLs without pain  The manual therapy interventions were performed at a separate and distinct time from the therapeutic activities interventions.     16 min Therapeutic Exercise:  [x]  See flow sheet   Rationale:      increase ROM and increase strength to improve the patients ability to perform ADLs without pain     10 MIN Billed With/As:   [] TE   [x] TA   [] Neuro   [] Self Care Patient Education: [x] Review HEP    [] Progressed/Changed HEP based on:   [] positioning   [] body mechanics   [] transfers   [] heat/ice application    [x] other: FOTO; GROC; reassessment and review goals       Other Objective/Functional Measures:  Pelvic Alignment: R anterior rotation    FOTO: 67/100  GROC: +5   Post Treatment Pain Level (on 0 to 10) scale:   0  / 10     ASSESSMENT  Assessment/Changes in Function:   SEE PROGRESS NOTE     [x]  See Progress Note/Recertification (13/70/5917)   Patient will continue to benefit from skilled PT services to modify and progress therapeutic interventions, address functional mobility deficits, address ROM deficits, address strength deficits, analyze and address soft tissue restrictions, analyze and cue movement patterns and analyze and modify body mechanics/ergonomics to attain remaining goals. Progress toward goals / Updated goals: · Short Term Goals: To be accomplished in 2  weeks:  1) Patient will be independent with performing daily initial HEP. MET  2. Patient will achieve +2 on GROC to improve sitting, walking. MET  · Long Term Goals: To be accomplished in 4-5 weeks:  1) Patient  will be independent  with comprehensive updated HEP. In Progress  2) Patient will increase bilat hip strength flexion and extension to at least 4/5 to increase ability to perform ADL's and LE functional activities. In Progress  3) Increase FOTO to 69 indicating improved function and quality of life. 67/100, In Progress  4)Patient will achieve +4 on GROC to improve sitting, walking. MET     PLAN  [x]  Upgrade activities as tolerated YES Continue plan of care   []  Discharge due to :    [x]  Other: Please provide updated HEP next visit.      Therapist: RONNA Loaiza    Date: 11/30/2020 Time: 9:48 AM     Future Appointments   Date Time Provider Tulio Hammond   12/2/2020  9:45 AM Khurram Machado, PTA [facility]   12/8/2020 10:15 AM Khurram Machado, PTA [facility]   12/10/2020 11:15 AM Ashley Aaron, PT [facility]   12/14/2020  9:45 AM Khurram Machado PTA [facility]   12/16/2020 10:15 AM Ashley Aaron, PT [facility]   12/28/2020  9:45 AM Khurram Machado, PTA [facility]   12/30/2020 10:15 AM Ashley Aaron, PT [facility]

## 2020-11-30 NOTE — PROGRESS NOTES
[address] [facility] PHYSICAL THERAPY AT [facility] [address] [facility] [address] [facility] [address]  Phone: [phone]  Fax: [phone]  PROGRESS NOTE  Patient Name: Kasia Cummings : 1955   Treatment/Medical Diagnosis: Low back pain [M54.5]   Referral Source: Felipe Mathews MD     Date of Initial Visit: 10/27/2020 Attended Visits: 10 Missed Visits: 0     SUMMARY OF TREATMENT  Pt has been making slow and steady progress in therapy since their IE on 10/27/2020. Patient's therapy has consisted of therapeutic exercise, therapeutic activity, neuromuscular re-education, manual therapy, hot pack modality, patient education and HEP. CURRENT STATUS  Pt reports 95% overall improvement with functional ADL's since beginning PT. Pt's pain range 1-3/10. Patient has demonstrated the following functional improvements in therapy: decreased pain and radicular sx, increased LE strength does not need to use SPC, sitting tolerance to 3-4hrs, increased walking distance <1 mile. Patient continues to need work on: sleeping on R side without pain, maintaining symmetrical pelvic alignment, increasing core/lumbpelivs and LE strength. Pelvic Alignment: R anterior rotation     FOTO: 67/100  GROC: +5    Goal/Measure of Progress Goal Met? 1. Patient Glynn Nova be independent  with comprehensive updated HEP. Status at last Eval: Initiated Current Status: Compliant Progressing   2. Patient will increase bilat hip strength flexion and extension to at least 4/5 to increase ability to perform ADL's and LE functional activities. Status at last Eval: Psoas right 3+/5, left 3+/5. Gluteus medius right 3+/5, left 4-/5, gluteus joanna right 2+/5, left 3-/5 , Quadriceps right 5/5, left 5/5,  Anterior tibialis right 5/5 , left 5/5 Current Status: Hip Flexion: 4-/5 BAM    Hip Extension: 3+/5 BAM Progressing   3. Increase FOTO to 69 indicating improved function and quality of life.     Status at last Eval: 57 Current Status: 67/100 Progressing   4. Patient will achieve +4 on GROC to improve sitting, walking. Status at last Eval: NA Current Status: +5 yes     New Goals to be achieved in __4__  weeks:  1. Patient Stefanie Mitchell be independent with comprehensive updated HEP in preparation for D/C.   2.  Patient will increase bilat hip strength flexion and extension to at least 4/5 to increase ability to perform ADL's and LE functional activities and allow for ambulation without AD. 3.  Increase FOTO to 69 indicating improved function and quality of life. 4.  Patient will maintain symmetrical pelvic alignment for 3 consecutive visits to allow for improved sitting/walking tolerance. RECOMMENDATIONS  Continue therapy 2-3x per week for 4 weeks to address remaining deficits and attain above stated goals. If you have any questions/comments please contact us directly at [phone]. Thank you for allowing us to assist in the care of your patient. Therapist Signature: RONNA Tidwell Date: 11/30/2020     Time: 10:40 AM   NOTE TO PHYSICIAN:  PLEASE COMPLETE THE ORDERS BELOW AND FAX TO   [facility] at [address]: [phone]. If you are unable to process this request in 24 hours please contact our office: [phone].    ___ I have read the above report and request that my patient continue as recommended.   ___ I have read the above report and request that my patient continue therapy with the following changes/special instructions:_________________________________________________   ___ I have read the above report and request that my patient be discharged from therapy.      Physician Signature:                   Date:       Time:

## 2020-12-02 ENCOUNTER — APPOINTMENT (OUTPATIENT)
Dept: PHYSICAL THERAPY | Age: 65
End: 2020-12-02
Payer: MEDICARE

## 2020-12-04 ENCOUNTER — HOSPITAL ENCOUNTER (OUTPATIENT)
Dept: PHYSICAL THERAPY | Age: 65
Discharge: HOME | End: 2020-12-04
Payer: MEDICARE

## 2020-12-04 PROCEDURE — 97140 MANUAL THERAPY 1/> REGIONS: CPT

## 2020-12-04 PROCEDURE — 97110 THERAPEUTIC EXERCISES: CPT

## 2020-12-04 NOTE — PROGRESS NOTES
PHYSICAL THERAPY - DAILY TREATMENT NOTE    Patient Name: Panchito Fonseca        Date: 2020  : 1955    Patient  Verified: YES  Visit #:  ( 21) 1  Insurance: Payor: Juan R Pizarro / Plan: [address] / Product Type: Managed Care Medicare /      In time: 11:00  Out time: 12:04    Total Treatment Time: 64     Medicare/[facility] Time Tracking (below)   Total Timed Codes (min):  64 1:1 Treatment Time:  64     TREATMENT AREA/ DIAGNOSIS = Low back pain [M54.5]    SUBJECTIVE  Pain Level (on 0 to 10 scale): 1-2 / 10   Medication Changes/New allergies or changes in medical history, any new surgeries or procedures? NO    If yes, update Summary List   Subjective Functional Status/Changes:  []  No changes reported   Pt performing HEP 1x per day. If she can she tries to a couple stretches right before to go bed.          OBJECTIVE  Modalities Rationale:  Pt deferred MH   decrease pain and increase tissue extensibility to improve patient's ability to perform ADLs without pain   min [] Estim, type/location:                                      []  att     []  unatt     []  w/US     []  w/ice    []  w/heat    min []  Mechanical Traction: type/lbs                   []  pro   []  sup   []  int   []  cont    []  before manual    []  after manual    min []  Ultrasound, settings/location:      min []  Iontophoresis w/ dexamethasone, location:                                               []  take home patch       []  in clinic    min []  Ice     []  Heat    location/position:     min []  Vasopneumatic Device, press/temp:     min []  Other:    [x] Skin assessment post-treatment (if applicable):    [x]  intact    []  redness- no adverse reaction     []redness - adverse reaction:        12 min Manual Therapy: Corrected right anterior innominate and right inflare with MET ; prone STM/DTM to lower lumbar paraspinals, L > R piriformis release   Rationale:      decrease pain, increase ROM and increase tissue extensibility to improve patient's ability to perform ADLs without pain  The manual therapy interventions were performed at a separate and distinct time from the therapeutic activities interventions. 44 min Therapeutic Exercise:  [x]  See flow sheet   Rationale:      increase ROM and increase strength to improve the patients ability to perform ADLs without pain     Billed With/As:   [x] TE   [] TA   [] Neuro   [] Self Care Patient Education: [x] Review HEP  , BET sit to stand, bending body mechanics  [] Progressed/Changed HEP based on:   [] positioning   [] body mechanics   [] transfers   [] heat/ice application    [x] other: FOTO; GROC; reassessment and review goals       Other Objective/Functional Measures:  Pelvic alignment: right anterior innominate; right inflare  Resume supine abdominal draw; add hip ADD/ABD with Orange tband   Post Treatment Pain Level (on 0 to 10) scale:   0  / 10     ASSESSMENT  Assessment/Changes in Function:   Add gentle hip strengthening exercise in supine secondary to right SI hypomobiity. Pt education in sit to stand and reaching body mechanics. Pt deferred MH to home. Pt instructed is self SI correction for R ant innominate with MET. [x]  See Progress Note/Recertification (85/80/3922)   Patient will continue to benefit from skilled PT services to modify and progress therapeutic interventions, address functional mobility deficits, address ROM deficits, address strength deficits, analyze and address soft tissue restrictions, analyze and cue movement patterns and analyze and modify body mechanics/ergonomics to attain remaining goals. Progress toward goals / Updated goals: · Short Term Goals: To be accomplished in 2  weeks:  1) Patient will be independent with performing daily initial HEP. MET  2. Patient will achieve +2 on GROC to improve sitting, walking. MET  · Long Term Goals:  To be accomplished in 4-5 weeks:  1) Patient  will be independent  with comprehensive updated HEP. In Progress  2) Patient will increase bilat hip strength flexion and extension to at least 4/5 to increase ability to perform ADL's and LE functional activities. In Progress  3) Increase FOTO to 69 indicating improved function and quality of life. 67/100, In Progress  4)Patient will achieve +4 on GROC to improve sitting, walking. MET     PLAN  [x]  Upgrade activities as tolerated YES Continue plan of care   []  Discharge due to :    [x]  Other: Please provide updated HEP next visit.      Therapist: Tammy Bennett PTA, LPTA    Date: 12/4/2020 Time: 12:04      Future Appointments   Date Time Provider Tulio Hammond   12/8/2020 10:15 AM Elaine Varner PTA [facility]   12/10/2020 11:15 AM Morna Gilford, PT [facility]   12/14/2020  9:45 AM Elaine Varner PTA [facility]   12/16/2020 10:15 AM Morna Gilford, PT [facility]   12/28/2020  9:45 AM Elaine Varner, PTA [facility]   12/30/2020 10:15 AM Morna Gilford, PT [facility]

## 2020-12-08 ENCOUNTER — HOSPITAL ENCOUNTER (OUTPATIENT)
Dept: PHYSICAL THERAPY | Age: 65
Discharge: HOME | End: 2020-12-08
Payer: MEDICARE

## 2020-12-08 PROCEDURE — 97110 THERAPEUTIC EXERCISES: CPT

## 2020-12-08 PROCEDURE — 97140 MANUAL THERAPY 1/> REGIONS: CPT

## 2020-12-08 NOTE — PROGRESS NOTES
PHYSICAL THERAPY - DAILY TREATMENT NOTE    Patient Name: Willow Yun        Date: 2020  : 1955    Patient  Verified: YES  Visit #:  (17) 8  of     Insurance: Payor: Khushi Kent / Plan: [address] / Product Type: Managed Care Medicare /      In time: 10:14  Out time: 11:07    Total Treatment Time: 53     Medicare/[facility] Time Tracking (below)   Total Timed Codes (min):  53 1:1 Treatment Time: 53     TREATMENT AREA/ DIAGNOSIS = Low back pain [M54.5]    SUBJECTIVE  Pain Level (on 0 to 10 scale): 1/ 10- right lateral thigh - ache   Medication Changes/New allergies or changes in medical history, any new surgeries or procedures? NO    If yes, update Summary List   Subjective Functional Status/Changes:  []  No changes reported   Pt .intermittent ache in right lateral thigh. There are some days I don't feel it at all and some days I feel it.           OBJECTIVE  Modalities Rationale:  Pt deferred MH   decrease pain and increase tissue extensibility to improve patient's ability to perform ADLs without pain   min [] Estim, type/location:                                      []  att     []  unatt     []  w/US     []  w/ice    []  w/heat    min []  Mechanical Traction: type/lbs                   []  pro   []  sup   []  int   []  cont    []  before manual    []  after manual    min []  Ultrasound, settings/location:      min []  Iontophoresis w/ dexamethasone, location:                                               []  take home patch       []  in clinic    min []  Ice     []  Heat    location/position:     min []  Vasopneumatic Device, press/temp:     min []  Other:    [x] Skin assessment post-treatment (if applicable):    [x]  intact    []  redness- no adverse reaction     []redness - adverse reaction:        12 min Manual Therapy: Corrected right anterior innominate and right inflare with MET ; prone STM/DTM to lower lumbar paraspinals, L > R piriformis release   Rationale: decrease pain, increase ROM and increase tissue extensibility to improve patient's ability to perform ADLs without pain  The manual therapy interventions were performed at a separate and distinct time from the therapeutic activities interventions. 45 min Therapeutic Exercise:  [x]  See flow sheet   Rationale:      increase ROM and increase strength to improve the patients ability to perform ADLs without pain     Billed With/As:   [x] TE   [] TA   [] Neuro   [] Self Care Patient Education: [x] Review HEP  , BET sit to stand, bending body mechanics  [] Progressed/Changed HEP based on:   [] positioning   [] body mechanics   [] transfers   [] heat/ice application    [x] other: FOTO; GROC; reassessment and review goals       Other Objective/Functional Measures:  Increase bike x 7 min, increase reps crunches 8x   Post Treatment Pain Level (on 0 to 10) scale:   0  / 10     ASSESSMENT  Assessment/Changes in Function:   Advanced gentle core stabilization exercise without sx increase; review self correction for right anterior SI with MET     [x]  See Progress Note/Recertification (17/07/5096)   Patient will continue to benefit from skilled PT services to modify and progress therapeutic interventions, address functional mobility deficits, address ROM deficits, address strength deficits, analyze and address soft tissue restrictions, analyze and cue movement patterns and analyze and modify body mechanics/ergonomics to attain remaining goals. Progress toward goals / Updated goals: · Short Term Goals: To be accomplished in 2  weeks:  1) Patient will be independent with performing daily initial HEP. MET  2. Patient will achieve +2 on GROC to improve sitting, walking. MET  · Long Term Goals: To be accomplished in 4-5 weeks:  1) Patient  will be independent  with comprehensive updated HEP.  In Progress  2) Patient will increase bilat hip strength flexion and extension to at least 4/5 to increase ability to perform ADL's and LE functional activities. In Progress  3) Increase FOTO to 69 indicating improved function and quality of life. 67/100, In Progress  4)Patient will achieve +4 on GROC to improve sitting, walking. MET     PLAN  [x]  Upgrade activities as tolerated YES Continue plan of care   []  Discharge due to :    [x]  Other: Please provide updated HEP next visit.      Therapist: Clary Tai PTA, LPTA    Date: 12/8/2020 Time:  11:07 am     Future Appointments   Date Time Provider Tulio Hammond   12/10/2020 11:15 AM Tae Anne [facility]   12/14/2020  9:45 AM Johny Allison PTA [facility]   12/16/2020 10:15 AM Glenny Craig, PT [facility]   12/28/2020  9:45 AM Johny Allison PTA [facility]   12/30/2020 10:15 AM Glenny Craig PT [facility]

## 2020-12-10 ENCOUNTER — HOSPITAL ENCOUNTER (OUTPATIENT)
Dept: PHYSICAL THERAPY | Age: 65
Discharge: HOME | End: 2020-12-10
Payer: MEDICARE

## 2020-12-10 ENCOUNTER — APPOINTMENT (OUTPATIENT)
Dept: PHYSICAL THERAPY | Age: 65
End: 2020-12-10
Payer: MEDICARE

## 2020-12-10 PROCEDURE — 97140 MANUAL THERAPY 1/> REGIONS: CPT

## 2020-12-10 PROCEDURE — 97110 THERAPEUTIC EXERCISES: CPT

## 2020-12-10 PROCEDURE — 97530 THERAPEUTIC ACTIVITIES: CPT

## 2020-12-10 NOTE — PROGRESS NOTES
PHYSICAL THERAPY - DAILY TREATMENT NOTE    Patient Name: Britni Console        Date: 12/10/2020  : 1955    Patient  Verified: YES  Visit #:   15   of   16  Insurance: Payor: Josephine Layman / Plan: [employer] MEDICARE COMPLETE / Product Type: Managed Care Medicare /      In time: 11 Out time: 11:45   Total Treatment Time: 45     Medicare/[employer] Time Tracking (below)   Total Timed Codes (min):  45 1:1 Treatment Time:  45     TREATMENT AREA/ DIAGNOSIS = Low back pain [M54.5]    SUBJECTIVE  Pain Level (on 0 to 10 scale):  0  / 10   Medication Changes/New allergies or changes in medical history, any new surgeries or procedures? NO    If yes, update Summary List   Subjective Functional Status/Changes:  []  No changes reported     Functional improvement no pain today, NO LE pain either   Functional limitation numb on the bottom of my R foot        OBJECTIVE      10 min Manual Therapy: DTM to L/S, R piriformis release,  MET to correct R ant innominate   Rationale:      decrease pain, increase ROM and increase tissue extensibility to improve patient's ability to perform ADLs without pain  The manual therapy interventions were performed at a separate and distinct time from the therapeutic activities interventions.     25 min Therapeutic Exercise:  [x]  See flow sheet   Rationale:      increase ROM and increase strength to improve the patients ability to perform ADLs without pain     10 min Therapeutic Activity: Review of posture and body mechanics   Rationale:    increase ROM and increase strength to improve the patients ability to perform ADLs without pain      Billed With/As:   [x] TE   [] TA   [] Neuro   [] Self Care Patient Education: [x] Review HEP    [] Progressed/Changed HEP based on:   [] positioning   [] body mechanics   [] transfers   [] heat/ice application    [] other:        Other Objective/Functional Measures:    Pt with no R LE radic symptoms and no LBP  No inflare of R hip, R ASIS lower, corrected with MET  Reviewed bending mechanics, sit to stand, lifting mechanics and siting posture    Post Treatment Pain Level (on 0 to 10) scale:   0  / 10     ASSESSMENT  Assessment/Changes in Function:     Pt progressing well  No pain despite slight R ant innminate  Numbness on bottom of R foot   []  See Progress Note/Recertification   Patient will continue to benefit from skilled PT services to modify and progress therapeutic interventions, address functional mobility deficits, address ROM deficits, address strength deficits, analyze and address soft tissue restrictions, analyze and cue movement patterns and analyze and modify body mechanics/ergonomics to attain remaining goals.    Progress toward goals / Updated goals:    No radic pain today  No LBP today  Good hip hinge     PLAN  [x]  Upgrade activities as tolerated YES Continue plan of care   []  Discharge due to :    []  Other:      Therapist: Jacqueline Cheatham PT    Date: 12/10/2020 Time: 11:44 AM     Future Appointments   Date Time Provider Tulio Hammond   12/14/2020  9:45 AM [facility]   12/16/2020 10:15 AM Jose Kennedy PT [facility]   12/28/2020  9:45 AM Teresa Aguayo PTA [facility]   12/30/2020 10:15 AM Jose Kennedy PT [facility]

## 2020-12-14 ENCOUNTER — HOSPITAL ENCOUNTER (OUTPATIENT)
Dept: PHYSICAL THERAPY | Age: 65
Discharge: HOME | End: 2020-12-14
Payer: MEDICARE

## 2020-12-14 PROCEDURE — 97140 MANUAL THERAPY 1/> REGIONS: CPT

## 2020-12-14 PROCEDURE — 97110 THERAPEUTIC EXERCISES: CPT

## 2020-12-14 NOTE — PROGRESS NOTES
PHYSICAL THERAPY - DAILY TREATMENT NOTE    Patient Name: Alivia Hull        Date: 2020  : 1955   yes Patient  Verified  Visit #:   Insurance: Payor: Zeus Freeman / [facility] / Product Type: Managed Care Medicare /      In time: 536 Out time: 1368   Total Treatment Time: 55     Medicare/[employer] Time Tracking (below)   Total Timed Codes (min):  45 1:1 Treatment Time:  45     TREATMENT AREA =  Low back pain [M54.5]    SUBJECTIVE  Pain Level (on 0 to 10 scale):  2  / 10 R Lumbar pain   Medication Changes/New allergies or changes in medical history, any new surgeries or procedures? See Summary List   Subjective Functional Status/Changes:  []  No changes reported   Pt reports less pain in lumbar region and no pain this day. Pt reports she is having less pain and has been able to amb with less use of cane. Steps are still an area of weakness and pain for pt but that is improving. OBJECTIVE    Modalities Rationale:     decrease pain and increase tissue extensibility   to improve patient's ability to ambulate with decreased pain.    min [] Estim, type/location:                                      []  att     []  unatt     []  w/US     []  w/ice    []  w/heat    min []  Mechanical Traction: type/lbs                   []  pro   []  sup   []  int   []  cont    []  before manual    []  after manual    min []  Ultrasound, settings/location:      min []  Iontophoresis w/ dexamethasone, location:                                               []  take home patch       []  in clinic   10 min []  Ice     [x]  Heat      Supine to lumbar    min []  Vasopneumatic Device, press/temp:     min []  Other:    [x] Skin assessment post-treatment (if applicable):    [x]  intact    []  redness- no adverse reaction     []redness - adverse reaction:        35 min Therapeutic Exercise:  [x]  See flow sheet:    Rationale:      increase ROM and increase strength to improve the patients ability to perform LE ADLs with decreased pain     10 min Manual Therapy:  prone  Technique:      [x] STM []ASTM [x] TPR []PROM [] Stretching  [x] Jt manipulation [x]Gr I [x] II [x]  III [x] IV [x] V []  Treatment Area: R lumbar    Other: Corrected right anterior innominate, prone STM/DTM to lumbar paraspinals, R>L piriformis release     Rationale: decrease pain, increase ROM, increase tissue extensibility, decrease trigger points and increase postural awareness to allow patient to amb with less pain    Billed With/As:   [x] TE   [] TA   [] Neuro   [] Self Care Patient Education: [x] Review HEP    [] Progressed/Changed HEP based on:   [] positioning   [] body mechanics   [] transfers   [] heat/ice application    [] other:      Other Objective/Functional Measures:   Reviewed and progressed standing there ex to improve pt strength and function on stairs and with transfers. R anterior innominate rotation with MET correction. Post Treatment Pain Level (on 0 to 10) scale:   2  / 10      ASSESSMENT  Assessment/Changes in Function:   Pt requires min to mod A for hip hinge and squatting, stair ascend and descend form. MET correction ed and good response in clinic this am.     [x]  See Progress Note/Recertification   Patient will benefit from skilled PT services to modify and progress therapeutic interventions, address functional mobility deficits, address ROM deficits, address strength deficits, analyze and address soft tissue restrictions, analyze and cue movement patterns, analyze and modify body mechanics/ergonomics and assess and modify postural abnormalities to attain goals and maximize pt's functional status   Progress toward goals / Updated goals:  4. Patient will maintain symmetrical pelvic alignment for 3 consecutive visits to allow for improved sitting/walking tolerance--pt requiring manual correction this session currently fair progress.           PLAN  [x]  Upgrade activities as tolerated yes Continue plan of care   []  Discharge due to :    []  Other:       Therapist: Anil Verduzco, PT      Date: 12/14/2020 Time: 9 AM     Future Appointments   Date Time Provider Tulio Hammond   12/14/2020  9:45 AM rEum Diaz PT [facility]   12/16/2020 10:15 AM Linus Malin PT [facility]   12/28/2020  9:45 AM Yolis Klein, PTA [facility]   12/30/2020 10:15 AM Linus Malin PT [facility]

## 2020-12-16 ENCOUNTER — APPOINTMENT (OUTPATIENT)
Dept: PHYSICAL THERAPY | Age: 65
End: 2020-12-16
Payer: MEDICARE

## 2020-12-16 ENCOUNTER — HOSPITAL ENCOUNTER (OUTPATIENT)
Dept: PHYSICAL THERAPY | Age: 65
Discharge: HOME | End: 2020-12-16
Payer: MEDICARE

## 2020-12-16 PROCEDURE — 97110 THERAPEUTIC EXERCISES: CPT

## 2020-12-16 PROCEDURE — 97140 MANUAL THERAPY 1/> REGIONS: CPT

## 2020-12-16 NOTE — PROGRESS NOTES
PHYSICAL THERAPY - DAILY TREATMENT NOTE    Patient Name: Elpidio Valdes        Date: 2020  : 1955    Patient  Verified: YES  Visit #:   15   of   16  Insurance: Payor: Jimenez Velazquez / Plan: [employer] MEDICARE COMPLETE / Product Type: Managed Care Medicare /      In time: 10:15 Out time: 11:05   Total Treatment Time: 50     Medicare/[employer] Time Tracking (below)   Total Timed Codes (min):  40 1:1 Treatment Time:  40     TREATMENT AREA/ DIAGNOSIS = Low back pain [M54.5]    SUBJECTIVE  Pain Level (on 0 to 10 scale):  1  / 10   Medication Changes/New allergies or changes in medical history, any new surgeries or procedures?     NO    If yes, update Summary List   Subjective Functional Status/Changes:  []  No changes reported     Functional improvement hardly any pain   Functional limitation  R buttock pain        OBJECTIVE  Modalities Rationale:     decrease pain and increase tissue extensibility to improve patient's ability to perform ADLs without pain   min [] Estim, type/location:                                      []  att     []  unatt     []  w/US     []  w/ice    []  w/heat    min []  Mechanical Traction: type/lbs                   []  pro   []  sup   []  int   []  cont    []  before manual    []  after manual    min []  Ultrasound, settings/location:      min []  Iontophoresis w/ dexamethasone, location:                                               []  take home patch       []  in clinic   10 min []  Ice     [x]  Heat    location/position: To R buttock in supine    min []  Vasopneumatic Device, press/temp:     min []  Other:    [x] Skin assessment post-treatment (if applicable):    [x]  intact    []  redness- no adverse reaction     []redness - adverse reaction:        15 min Manual Therapy: DTM to L/S, R piriformis release,  L5 sacral base GD IV PA mobs, pubic clearing and MET to correct R ant innominate   Rationale:      decrease pain, increase ROM and increase tissue extensibility to improve patient's ability to perform ADLs without pain  The manual therapy interventions were performed at a separate and distinct time from the therapeutic activities interventions. 25 min Therapeutic Exercise:  [x]  See flow sheet   Rationale:      increase ROM and increase strength to improve the patients ability to perform ADLs without pain     Billed With/As:   [x] TE   [] TA   [] Neuro   [] Self Care Patient Education: [x] Review HEP    [] Progressed/Changed HEP based on:   [] positioning   [] body mechanics   [] transfers   [] heat/ice application    [] other:        Other Objective/Functional Measures:    Pt with mild R buttock pain and lower R ASIS, but not sore in R SI joint  No pain distal of the buttock  R Ant innominate corrected with MET  Reviewed self correction    Post Treatment Pain Level (on 0 to 10) scale:   0  / 10     ASSESSMENT  Assessment/Changes in Function:     Low pain levels lately  Able to self correct at home with unilateral bridge 3 x 10 sec     []  See Progress Note/Recertification   Patient will continue to benefit from skilled PT services to modify and progress therapeutic interventions, address functional mobility deficits, address ROM deficits, address strength deficits, analyze and address soft tissue restrictions, analyze and cue movement patterns, analyze and modify body mechanics/ergonomics and assess and modify postural abnormalities to attain remaining goals.    Progress toward goals / Updated goals:    Low pain levels     PLAN  [x]  Upgrade activities as tolerated YES Continue plan of care   []  Discharge due to :    []  Other:      Therapist: Oddis Bernheim, PT    Date: 12/16/2020 Time: 10:14 AM     Future Appointments   Date Time Provider Tulio Hammond   12/16/2020 10:15 AM David Negrete PT [place] [id number] [address]   12/28/2020  9:45 AM Anurag Sherwood PTA [place] [id number] [address]   12/30/2020 10:15 AM David Negrete PT [address] [place]

## 2020-12-28 ENCOUNTER — APPOINTMENT (OUTPATIENT)
Dept: PHYSICAL THERAPY | Age: 65
End: 2020-12-28
Payer: MEDICARE

## 2020-12-30 ENCOUNTER — HOSPITAL ENCOUNTER (OUTPATIENT)
Dept: PHYSICAL THERAPY | Age: 65
Discharge: HOME | End: 2020-12-30
Payer: MEDICARE

## 2020-12-30 ENCOUNTER — APPOINTMENT (OUTPATIENT)
Dept: PHYSICAL THERAPY | Age: 65
End: 2020-12-30
Payer: MEDICARE

## 2020-12-30 PROCEDURE — 97110 THERAPEUTIC EXERCISES: CPT

## 2020-12-30 PROCEDURE — 97530 THERAPEUTIC ACTIVITIES: CPT

## 2020-12-30 NOTE — PROGRESS NOTES
PHYSICAL THERAPY - DAILY TREATMENT NOTE    Patient Name: Kirstie Lombard        Date: 2020  : 1955    Patient  Verified: YES  Visit #:     Insurance: Payor: Jack Reveles / Plan: [facility] MEDICARE COMPLETE / Product Type: Managed Care Medicare /      In time: 10:15 Out time: 10:45   Total Treatment Time: 30     Medicare/[facility] Time Tracking (below)   Total Timed Codes (min):  30 1:1 Treatment Time:  30     TREATMENT AREA/ DIAGNOSIS = Low back pain [M54.5]    SUBJECTIVE  Pain Level (on 0 to 10 scale):  1  / 10   Medication Changes/New allergies or changes in medical history, any new surgeries or procedures?     NO    If yes, update Summary List   Subjective Functional Status/Changes:  []  No changes reported     Functional improvement I have no pain    Functional limitation  R calf pain 2x/week with standing and walking        OBJECTIVE      20 min Therapeutic Exercise:  [x]  See flow sheet   Rationale:      increase ROM and increase strength to improve the patients ability to perform ADLs without pain      10 min Therapeutic Activity: Reviewed HEP, sitting posture and body mechanics       Billed With/As:   [x] TE   [] TA   [] Neuro   [] Self Care Patient Education: [x] Review HEP    [] Progressed/Changed HEP based on:   [] positioning   [] body mechanics   [] transfers   [] heat/ice application    [] other:        Other Objective/Functional Measures:    Pt with no LBP and no LE pain  Even leg length and ASIS   Post Treatment Pain Level (on 0 to 10) scale:   0  / 10     ASSESSMENT  Assessment/Changes in Function:     Pt with good hip hinge, sit to stand and squat     []  See Progress Note/Recertification   Patient will continue to benefit from skilled PT services to modify and progress therapeutic interventions, address functional mobility deficits, address ROM deficits, address strength deficits, analyze and address soft tissue restrictions, analyze and cue movement patterns, analyze and modify body mechanics/ergonomics and assess and modify postural abnormalities to attain remaining goals.    Progress toward goals / Updated goals:    Low pain levels  No radic symptoms     PLAN  [x]  Upgrade activities as tolerated YES Continue plan of care   []  Discharge due to :    []  Other:      Therapist: Ganesh Carreno PT    Date: 12/30/2020 Time: 11:00 AM     Future Appointments   Date Time Provider Tulio Hammond   1/6/2021  9:30 AM Vivi Roach PT [facility]

## 2020-12-30 NOTE — PROGRESS NOTES
[facility] PHYSICAL THERAPY AT [address] [facility] [place], [address] [facility], [address], [address] [place] [address]  Phone: [phone]  Fax: [phone] PROGRESS NOTE Patient Name: Keanu Piedra : 1955 Treatment/Medical Diagnosis: Low back pain [M54.5] Referral Source: Kimberlee Galvin MD    
Date of Initial Visit: 10/27/2020 Attended Visits: 10 Missed Visits: 0  
 
SUMMARY OF TREATMENT Pt has been making slow and steady progress in therapy since their IE on 10/27/2020. Patient's therapy has consisted of therapeutic exercise, therapeutic activity, neuromuscular re-education, manual therapy, hot pack modality, patient education and HEP. CURRENT STATUS Pt reports 95% overall improvement with functional ADL's since beginning PT. Pt's pain range 1-3/10. Patient has demonstrated the following functional improvements in therapy: decreased pain and radicular sx, increased LE strength does not need to use SPC, sitting tolerance to 3-4hrs, increased walking distance <1 mile. Patient continues to need work on: sleeping on R side without pain, maintaining symmetrical pelvic alignment, increasing core/lumbpelivs and LE strength. Pelvic Alignment: R anterior rotation 
  
FOTO: 67/100 GROC: +5 
 
Goal/Measure of Progress Goal Met? 1. Patient [place] Innocent be independent  with comprehensive updated HEP. Status at last Eval: Initiated Current Status: Compliant Progressing 2. Patient will increase bilat hip strength flexion and extension to at least 4/5 to increase ability to perform ADL's and LE functional activities. Status at last Eval: Psoas right 3+/5, left 3+/5. Gluteus medius right 3+/5, left 4-/5, gluteus joanna right 2+/5, left 3-/5 , Quadriceps right 5/5, left 5/5,  Anterior tibialis right 5/5 , left 5/5 Current Status: Hip Flexion: 4-/5 BAM Hip Extension: 3+/5 BAM Progressing 3. Increase FOTO to 69 indicating improved function and quality of life. Status at last Eval: 57/100 Current Status: 67/100 Progressing 4. Patient will achieve +4 on GROC to improve sitting, walking. Status at last Eval: NA Current Status: +5 yes New Goals to be achieved in __4__  weeks: 1. Patient Bernice Fernandez be independent with comprehensive updated HEP in preparation for D/C.  
2.  Patient will increase bilat hip strength flexion and extension to at least 4/5 to increase ability to perform ADL's and LE functional activities and allow for ambulation without AD. 3.  Increase FOTO to 69 indicating improved function and quality of life. 4.  Patient will maintain symmetrical pelvic alignment for 3 consecutive visits to allow for improved sitting/walking tolerance. RECOMMENDATIONS Continue therapy 2-3x per week for 4 weeks to address remaining deficits and attain above stated goals. If you have any questions/comments please contact us directly at [phone]. Thank you for allowing us to assist in the care of your patient. Therapist Signature: Sheree Landon PT, LPTA Date: 12/30/2020 Time: 10:40 AM  
NOTE TO PHYSICIAN:  PLEASE COMPLETE THE ORDERS BELOW AND FAX TO [facility] [place]: [phone]. If you are unable to process this request in 24 hours please contact our office: [phone]. 
 
___ I have read the above report and request that my patient continue as recommended.  
___ I have read the above report and request that my patient continue therapy with the following changes/special instructions:_________________________________________________  
___ I have read the above report and request that my patient be discharged from therapy.   
 
Physician Signature:                   Date:       Time:

## 2021-01-06 ENCOUNTER — HOSPITAL ENCOUNTER (OUTPATIENT)
Dept: PHYSICAL THERAPY | Age: 66
Discharge: HOME | End: 2021-01-06
Payer: MEDICARE

## 2021-01-06 PROCEDURE — 97110 THERAPEUTIC EXERCISES: CPT

## 2021-01-06 NOTE — PROGRESS NOTES
[facility] PHYSICAL THERAPY AT [facility] [address] [place], [address]  Phone: [phone]  Fax: [phone] SUMMARY FOR PHYSICAL THERAPY          Patient Name: Simran Dominguez : 1955   Treatment/Medical Diagnosis: Low back pain [M54.5]   Onset Date: 2020    Referral Source: Jessi Wise MD [place] [facility]): 10/27/20   Prior Hospitalization: See Medical History Provider #: [id number]   Prior Level of Function: Independent   Comorbidities: HBP, diabetes   Medications: Verified on Patient Summary List   Visits from [facility]: 17 Missed Visits: 0      Previous Goals:  1. Patient Maylon Ebbs be independent with comprehensive updated HEP in preparation for D/C.   2.  Patient will increase bilat hip strength flexion and extension to at least 4/5 to increase ability to perform ADL's and LE functional activities and allow for ambulation without AD. 3.  Increase FOTO to 69 indicating improved function and quality of life.    4.  Patient will maintain symmetrical pelvic alignment for 3 consecutive visits to allow for improved sitting/walking tolerance. Prior Level/Current Level:  1) Prior Level: progressing   Current Level: independent   Goal Met? yes  2) Prior Level: hip FLX 4-/5 B, hip EXT 3+/5   Current Level: hip FLX 4-/5 B, hip EXT 3+/5   Goal Met? progressing  3) Prior Level: 67   Current Level: 76   Goal Met? yes  4) Prior Level: NA   Current Level: pelvic alignment symmetrical since last PN   Goal Met? yes     Key Functional Changes/Progress: The patient has progressed well with all therapeutic interventions. She reports minimal tingling in R foot, no LBP, and independence in HEP. Assessments/Recommendations: Discontinue therapy. Progressing towards or have reached established goals. If you have any questions/comments please contact us directly at [phone]. Thank you for allowing us to assist in the care of your patient.     Therapist Signature: Karo Lujan PT, DPT, MTC, CMTPT Date: 1/6/21   Dates of Service:  Certification Period: 10/27/20-1/6/21  10/27/20-1/25/21 Time: 10:48 AM

## 2021-01-06 NOTE — PROGRESS NOTES
PHYSICAL THERAPY - DAILY TREATMENT NOTE    Patient Name: Aria Albert        Date: 2021  : 1955    Patient  Verified: YES  Visit #:   17   of   17  Insurance: Payor: [facility] MEDICARE COMPLETE / Plan: [facility] MEDICARE COMPLETE / Product Type: Managed Care Medicare /      In time: 935 Out time: 10:15   Total Treatment Time: 40     Medicare/BCBS Time Tracking (below)   Total Timed Codes (min):  40 1:1 Treatment Time:  40     TREATMENT AREA/ DIAGNOSIS = Low back pain [M54.5]    SUBJECTIVE  Pain Level (on 0 to 10 scale):  0  / 10   Medication Changes/New allergies or changes in medical history, any new surgeries or procedures?    NO    If yes, update Summary List   Subjective Functional Status/Changes:  []  No changes reported     Functional improvement I have no pain, I do my stretches and exercises at home    Functional limitation  Mild foot numbness throughout the day, but does not affect ADLs       OBJECTIVE      40 min Therapeutic Exercise:  [x]  See flow sheet   Rationale:      increase ROM and increase strength to improve the patient’s ability to perform ADLs without pain    Billed With/As:   [x] TE   [] TA   [] Neuro   [] Self Care Patient Education: [x] Review HEP    [] Progressed/Changed HEP based on:   [] positioning   [] body mechanics   [] transfers   [] heat/ice application    [] other:        Other Objective/Functional Measures:    No reports of LBP nor LE pain  Hip flexor strength B 4/5, hip extension 3/5 but limited due to decreased hip extension ROM.  Added hip flexor stretch B to patient's HEP, especially while working (sitting for prolonged periods)  FOTO: 76   Post Treatment Pain Level (on 0 to 10) scale:   0  / 10     ASSESSMENT  Assessment/Changes in Function:     Pt independent in exercise program and with body mechanics training. Ready for DC.      [x]  See DC   Patient will continue to benefit from skilled PT services to [place] to attain remaining goals.   Progress toward goals /  Updated goals:    See DC     PLAN  [x]  Upgrade activities as tolerated NO Continue plan of care   [x]  Discharge due to : Achievement of goals, able to perform ADLs and HEP independently   []  Other:      Therapist: Karo Lujan, PT    Date: 1/6/2021 Time: 11:00 AM     No future appointments.